# Patient Record
Sex: MALE | URBAN - METROPOLITAN AREA
[De-identification: names, ages, dates, MRNs, and addresses within clinical notes are randomized per-mention and may not be internally consistent; named-entity substitution may affect disease eponyms.]

---

## 2018-08-28 ENCOUNTER — OFFICE VISIT (OUTPATIENT)
Dept: URBAN - METROPOLITAN AREA CLINIC 76 | Facility: CLINIC | Age: 83
End: 2018-08-28
Payer: MEDICARE

## 2018-08-28 DIAGNOSIS — H52.223 REGULAR ASTIGMATISM, BILATERAL: ICD-10-CM

## 2018-08-28 DIAGNOSIS — Z98.42 CATARACT EXTRACTION STATUS, LEFT EYE: Primary | ICD-10-CM

## 2018-08-28 DIAGNOSIS — Z98.41 CATARACT EXTRACTION STATUS, RIGHT EYE: ICD-10-CM

## 2018-08-28 PROCEDURE — 92004 COMPRE OPH EXAM NEW PT 1/>: CPT | Performed by: OPTOMETRIST

## 2018-08-28 ASSESSMENT — VISUAL ACUITY
OS: 20/30
OD: 20/20

## 2018-08-28 ASSESSMENT — KERATOMETRY
OS: 45.50
OD: 45.38

## 2018-08-28 ASSESSMENT — INTRAOCULAR PRESSURE
OD: 8
OS: 13

## 2018-08-28 NOTE — IMPRESSION/PLAN
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 Plan: Discussed diagnosis with patient. Dispensed new MRx today. Discussed diagnosis with patient. Dispensed new MRx today.

## 2020-03-20 ENCOUNTER — OFFICE VISIT (OUTPATIENT)
Dept: URBAN - METROPOLITAN AREA CLINIC 76 | Facility: CLINIC | Age: 85
End: 2020-03-20
Payer: COMMERCIAL

## 2020-03-20 DIAGNOSIS — Z96.1 PRESENCE OF INTRAOCULAR LENS: ICD-10-CM

## 2020-03-20 PROCEDURE — 92014 COMPRE OPH EXAM EST PT 1/>: CPT | Performed by: OPTOMETRIST

## 2020-03-20 ASSESSMENT — INTRAOCULAR PRESSURE
OS: 15
OD: 12

## 2020-03-20 ASSESSMENT — KERATOMETRY
OS: 45.88
OD: 45.50

## 2020-03-20 ASSESSMENT — VISUAL ACUITY
OD: 20/30
OS: 20/30

## 2021-09-27 ENCOUNTER — OFFICE VISIT (OUTPATIENT)
Dept: URBAN - METROPOLITAN AREA CLINIC 76 | Facility: CLINIC | Age: 86
End: 2021-09-27
Payer: COMMERCIAL

## 2021-09-27 PROCEDURE — 92014 COMPRE OPH EXAM EST PT 1/>: CPT | Performed by: OPTOMETRIST

## 2021-09-27 ASSESSMENT — VISUAL ACUITY
OS: 20/25
OD: 20/40

## 2021-09-27 ASSESSMENT — KERATOMETRY
OD: 45.88
OS: 46.13

## 2021-09-27 ASSESSMENT — INTRAOCULAR PRESSURE
OD: 11
OS: 15

## 2022-10-03 ENCOUNTER — OFFICE VISIT (OUTPATIENT)
Dept: URBAN - METROPOLITAN AREA CLINIC 76 | Facility: CLINIC | Age: 87
End: 2022-10-03
Payer: COMMERCIAL

## 2022-10-03 DIAGNOSIS — Z96.1 PRESENCE OF INTRAOCULAR LENS: ICD-10-CM

## 2022-10-03 DIAGNOSIS — H52.223 REGULAR ASTIGMATISM, BILATERAL: Primary | ICD-10-CM

## 2022-10-03 PROCEDURE — 92014 COMPRE OPH EXAM EST PT 1/>: CPT | Performed by: OPTOMETRIST

## 2022-10-03 ASSESSMENT — INTRAOCULAR PRESSURE
OS: 16
OD: 10

## 2022-10-03 ASSESSMENT — VISUAL ACUITY
OS: 20/25
OD: 20/25

## 2022-10-03 ASSESSMENT — KERATOMETRY
OD: 45.88
OS: 45.50